# Patient Record
Sex: MALE | URBAN - METROPOLITAN AREA
[De-identification: names, ages, dates, MRNs, and addresses within clinical notes are randomized per-mention and may not be internally consistent; named-entity substitution may affect disease eponyms.]

---

## 2017-10-09 ENCOUNTER — EMERGENCY (EMERGENCY)
Facility: HOSPITAL | Age: 32
LOS: 1 days | Discharge: HOME | End: 2017-10-09
Attending: EMERGENCY MEDICINE | Admitting: EMERGENCY MEDICINE
Payer: MEDICAID

## 2017-10-09 VITALS
DIASTOLIC BLOOD PRESSURE: 61 MMHG | OXYGEN SATURATION: 97 % | SYSTOLIC BLOOD PRESSURE: 101 MMHG | RESPIRATION RATE: 17 BRPM | HEART RATE: 84 BPM | TEMPERATURE: 99 F

## 2017-10-09 VITALS
TEMPERATURE: 99 F | HEART RATE: 129 BPM | RESPIRATION RATE: 20 BRPM | DIASTOLIC BLOOD PRESSURE: 74 MMHG | SYSTOLIC BLOOD PRESSURE: 122 MMHG | OXYGEN SATURATION: 95 %

## 2017-10-09 DIAGNOSIS — R41.82 ALTERED MENTAL STATUS, UNSPECIFIED: ICD-10-CM

## 2017-10-09 DIAGNOSIS — F14.10 COCAINE ABUSE, UNCOMPLICATED: ICD-10-CM

## 2017-10-09 LAB
ALBUMIN SERPL ELPH-MCNC: 4.3 G/DL — SIGNIFICANT CHANGE UP (ref 3.4–5)
ALP SERPL-CCNC: 65 U/L — SIGNIFICANT CHANGE UP (ref 40–120)
ALT FLD-CCNC: 25 U/L — SIGNIFICANT CHANGE UP (ref 12–42)
ANION GAP SERPL CALC-SCNC: 12 MMOL/L — SIGNIFICANT CHANGE UP (ref 9–16)
APPEARANCE UR: CLEAR — SIGNIFICANT CHANGE UP
AST SERPL-CCNC: 26 U/L — SIGNIFICANT CHANGE UP (ref 15–37)
BILIRUB SERPL-MCNC: 0.5 MG/DL — SIGNIFICANT CHANGE UP (ref 0.2–1.2)
BILIRUB UR-MCNC: NEGATIVE — SIGNIFICANT CHANGE UP
BUN SERPL-MCNC: 14 MG/DL — SIGNIFICANT CHANGE UP (ref 7–23)
CALCIUM SERPL-MCNC: 9 MG/DL — SIGNIFICANT CHANGE UP (ref 8.5–10.5)
CHLORIDE SERPL-SCNC: 103 MMOL/L — SIGNIFICANT CHANGE UP (ref 96–108)
CK MB BLD-MCNC: 1.31 % — SIGNIFICANT CHANGE UP
CK MB CFR SERPL CALC: 4.4 NG/ML — HIGH (ref 0.5–3.6)
CK SERPL-CCNC: 336 U/L — HIGH (ref 39–308)
CO2 SERPL-SCNC: 25 MMOL/L — SIGNIFICANT CHANGE UP (ref 22–31)
COLOR SPEC: YELLOW — SIGNIFICANT CHANGE UP
CREAT SERPL-MCNC: 1.19 MG/DL — SIGNIFICANT CHANGE UP (ref 0.5–1.3)
DIFF PNL FLD: NEGATIVE — SIGNIFICANT CHANGE UP
ETHANOL SERPL-MCNC: <3 MG/DL — SIGNIFICANT CHANGE UP
GLUCOSE SERPL-MCNC: 155 MG/DL — HIGH (ref 70–99)
GLUCOSE UR QL: NEGATIVE — SIGNIFICANT CHANGE UP
HCT VFR BLD CALC: 44.4 % — SIGNIFICANT CHANGE UP (ref 39–50)
HGB BLD-MCNC: 15.3 G/DL — SIGNIFICANT CHANGE UP (ref 13–17)
HIV 1 & 2 AB SERPL IA.RAPID: SIGNIFICANT CHANGE UP
KETONES UR-MCNC: NEGATIVE — SIGNIFICANT CHANGE UP
LEUKOCYTE ESTERASE UR-ACNC: NEGATIVE — SIGNIFICANT CHANGE UP
MCHC RBC-ENTMCNC: 30.3 PG — SIGNIFICANT CHANGE UP (ref 27–34)
MCHC RBC-ENTMCNC: 34.5 G/DL — SIGNIFICANT CHANGE UP (ref 32–36)
MCV RBC AUTO: 87.9 FL — SIGNIFICANT CHANGE UP (ref 80–100)
NITRITE UR-MCNC: NEGATIVE — SIGNIFICANT CHANGE UP
PCP SPEC-MCNC: SIGNIFICANT CHANGE UP
PH UR: 6.5 — SIGNIFICANT CHANGE UP (ref 5–8)
PLATELET # BLD AUTO: 224 K/UL — SIGNIFICANT CHANGE UP (ref 150–400)
POTASSIUM SERPL-MCNC: 4.1 MMOL/L — SIGNIFICANT CHANGE UP (ref 3.5–5.3)
POTASSIUM SERPL-SCNC: 4.1 MMOL/L — SIGNIFICANT CHANGE UP (ref 3.5–5.3)
PROT SERPL-MCNC: 7.5 G/DL — SIGNIFICANT CHANGE UP (ref 6.4–8.2)
PROT UR-MCNC: 30 MG/DL
RBC # BLD: 5.05 M/UL — SIGNIFICANT CHANGE UP (ref 4.2–5.8)
RBC # FLD: 12.3 % — SIGNIFICANT CHANGE UP (ref 10.3–16.9)
SODIUM SERPL-SCNC: 140 MMOL/L — SIGNIFICANT CHANGE UP (ref 132–145)
SP GR SPEC: 1.02 — SIGNIFICANT CHANGE UP (ref 1–1.03)
TROPONIN I SERPL-MCNC: <0.017 NG/ML — LOW (ref 0.02–0.06)
UROBILINOGEN FLD QL: 0.2 E.U./DL — SIGNIFICANT CHANGE UP
WBC # BLD: 11 K/UL — HIGH (ref 3.8–10.5)
WBC # FLD AUTO: 11 K/UL — HIGH (ref 3.8–10.5)

## 2017-10-09 PROCEDURE — 71010: CPT | Mod: 26

## 2017-10-09 PROCEDURE — 93010 ELECTROCARDIOGRAM REPORT: CPT

## 2017-10-09 PROCEDURE — 99220: CPT | Mod: 25

## 2017-10-09 RX ORDER — SODIUM CHLORIDE 9 MG/ML
1000 INJECTION INTRAMUSCULAR; INTRAVENOUS; SUBCUTANEOUS ONCE
Refills: 0 | Status: COMPLETED | OUTPATIENT
Start: 2017-10-09 | End: 2017-10-09

## 2017-10-09 RX ADMIN — SODIUM CHLORIDE 500 MILLILITER(S): 9 INJECTION INTRAMUSCULAR; INTRAVENOUS; SUBCUTANEOUS at 12:33

## 2017-10-09 NOTE — ED ADULT NURSE REASSESSMENT NOTE - NS ED NURSE REASSESS COMMENT FT1
Pt given information for rehab and shelter.
aox3 verbal, answers orientation question appropriately. recently arrived from [place], [place] and admits to drinking alcohol, smoking crack and k2. eyes blood shot, denies any c/o chest pain no sob no dyspnea, nad noted. +emesis yellow tinged fluid- scant amount. labs drawn and sent. placed on cardiac monitor- will monitor.

## 2017-10-09 NOTE — ED ADULT NURSE NOTE - CHPI ED SYMPTOMS NEG
no blurred vision/no confusion/no dizziness/no fever/no loss of consciousness/no numbness/no weakness/no change in level of consciousness

## 2017-10-09 NOTE — ED PROVIDER NOTE - CARE PLAN
Principal Discharge DX:	Altered mental status  Secondary Diagnosis:	Drug abuse  Secondary Diagnosis:	Crack cocaine use

## 2017-10-09 NOTE — ED CDU PROVIDER NOTE - PROGRESS NOTE DETAILS
IV fluids ordered, EKG shows sinus tach Patient more awake, oriented to person, place and time of day, conversant.

## 2024-02-05 RX ORDER — RISPERIDONE 4 MG/1
0 TABLET ORAL
Qty: 0 | Refills: 0 | DISCHARGE